# Patient Record
Sex: MALE | URBAN - NONMETROPOLITAN AREA
[De-identification: names, ages, dates, MRNs, and addresses within clinical notes are randomized per-mention and may not be internally consistent; named-entity substitution may affect disease eponyms.]

---

## 2021-03-01 ENCOUNTER — NEW PATIENT (OUTPATIENT)
Dept: URBAN - NONMETROPOLITAN AREA CLINIC 13 | Facility: CLINIC | Age: 51
End: 2021-03-01
Payer: COMMERCIAL

## 2021-03-01 PROCEDURE — 92250 FUNDUS PHOTOGRAPHY W/I&R: CPT | Performed by: OPTOMETRIST

## 2021-03-01 PROCEDURE — 99204 OFFICE O/P NEW MOD 45 MIN: CPT | Performed by: OPTOMETRIST

## 2021-03-01 ASSESSMENT — INTRAOCULAR PRESSURE
OD: 13
OS: 14

## 2021-03-01 ASSESSMENT — VISUAL ACUITY
OD: 20/80
OS: 20/20

## 2021-03-29 ENCOUNTER — Encounter (OUTPATIENT)
Dept: URBAN - NONMETROPOLITAN AREA CLINIC 13 | Facility: CLINIC | Age: 51
End: 2021-03-29
Payer: COMMERCIAL

## 2021-03-29 DIAGNOSIS — H46.9 UNSPECIFIED OPTIC NEURITIS: Primary | ICD-10-CM

## 2021-03-29 PROCEDURE — 92083 EXTENDED VISUAL FIELD XM: CPT | Performed by: OPTOMETRIST

## 2021-05-06 ENCOUNTER — OFFICE VISIT (OUTPATIENT)
Dept: URBAN - NONMETROPOLITAN AREA CLINIC 13 | Facility: CLINIC | Age: 51
End: 2021-05-06
Payer: COMMERCIAL

## 2021-05-06 DIAGNOSIS — H47.011 ISCHEMIC OPTIC NEUROPATHY, RIGHT EYE: Primary | ICD-10-CM

## 2021-05-06 PROCEDURE — 92235 FLUORESCEIN ANGRPH MLTIFRAME: CPT | Performed by: OPHTHALMOLOGY

## 2021-05-06 PROCEDURE — 99205 OFFICE O/P NEW HI 60 MIN: CPT | Performed by: OPHTHALMOLOGY

## 2021-05-06 PROCEDURE — 92134 CPTRZ OPH DX IMG PST SGM RTA: CPT | Performed by: OPHTHALMOLOGY

## 2021-05-06 ASSESSMENT — INTRAOCULAR PRESSURE
OS: 19
OD: 18

## 2021-05-06 NOTE — IMPRESSION/PLAN
Impression: Ischemic optic neuropathy, right eye: H47.011. Plan: Optic nerve pallor - patient needs urgent referral for MRI brain and orbits with and without contrast and ESR/CRP/platelets to rule out Giant Cell Arteritis and then likely referral to neurology for further evaluation and management. Patient also needs to be evaluated for hypertension and DM2 as these are risk factors for ischemic optic neuropathy Likely diagnosis of non-arteritic ischemic optic neuropathy but this is a diagnosis of exclusion